# Patient Record
Sex: MALE | NOT HISPANIC OR LATINO | Employment: FULL TIME | URBAN - METROPOLITAN AREA
[De-identification: names, ages, dates, MRNs, and addresses within clinical notes are randomized per-mention and may not be internally consistent; named-entity substitution may affect disease eponyms.]

---

## 2020-08-17 ENCOUNTER — OFFICE VISIT (OUTPATIENT)
Dept: PRIMARY CARE CLINIC | Facility: CLINIC | Age: 65
End: 2020-08-17
Payer: COMMERCIAL

## 2020-08-17 VITALS
HEART RATE: 95 BPM | TEMPERATURE: 98 F | BODY MASS INDEX: 24.99 KG/M2 | HEIGHT: 65 IN | DIASTOLIC BLOOD PRESSURE: 76 MMHG | RESPIRATION RATE: 18 BRPM | OXYGEN SATURATION: 98 % | SYSTOLIC BLOOD PRESSURE: 128 MMHG | WEIGHT: 150 LBS

## 2020-08-17 DIAGNOSIS — Z00.01 ANNUAL VISIT FOR GENERAL ADULT MEDICAL EXAMINATION WITH ABNORMAL FINDINGS: Primary | ICD-10-CM

## 2020-08-17 DIAGNOSIS — L98.9 SKIN LESION: ICD-10-CM

## 2020-08-17 DIAGNOSIS — K40.90 HERNIA, INGUINAL, RIGHT: ICD-10-CM

## 2020-08-17 DIAGNOSIS — E03.9 HYPOTHYROIDISM, UNSPECIFIED TYPE: ICD-10-CM

## 2020-08-17 PROCEDURE — 99396 PREV VISIT EST AGE 40-64: CPT | Mod: S$GLB,,, | Performed by: FAMILY MEDICINE

## 2020-08-17 PROCEDURE — 99396 PR PREVENTIVE VISIT,EST,40-64: ICD-10-PCS | Mod: S$GLB,,, | Performed by: FAMILY MEDICINE

## 2020-08-17 RX ORDER — LEVOTHYROXINE SODIUM 100 UG/1
100 TABLET ORAL
COMMUNITY

## 2020-08-17 NOTE — PROGRESS NOTES
"Subjective:       Patient ID: Marlo Downey is a 64 y.o. male.    Chief Complaint: Annual Exam    Patient presents for his annual wellness visit.  It has been a few years since he has been in.  He states overall he is feeling well except for his right inguinal hernia which is slowly worsening.  He is ready to see someone for it.  Also with history of a skin lesion to his right ear.  It has been there for the last few years.  He will scratch it away and then it will return.  He has never seen a dermatology.  Also with history of hypothyroidism.  He currently takes levothyroxine 100 mcg daily.  He states he feels it is working well.  He denies any side effects.        Review of Systems   Constitutional: Negative for chills, fatigue, fever and unexpected weight change.   Eyes: Negative for visual disturbance.   Respiratory: Negative for shortness of breath.    Cardiovascular: Negative for chest pain, palpitations and leg swelling.   Gastrointestinal: Negative for abdominal pain, blood in stool, constipation, diarrhea, nausea and vomiting.   Genitourinary: Negative for dysuria and hematuria.   Musculoskeletal: Negative for arthralgias and back pain.   Skin: Negative for rash and wound.   Neurological: Negative for dizziness, tremors, syncope, weakness, light-headedness, numbness and headaches.   Hematological: Negative for adenopathy. Does not bruise/bleed easily.   Psychiatric/Behavioral: Negative for dysphoric mood. The patient is not nervous/anxious.      Medication List with Changes/Refills   Current Medications    LEVOTHYROXINE (SYNTHROID) 100 MCG TABLET    Take 100 mcg by mouth before breakfast.    MULTIVITAMIN WITH MINERALS TABLET    Take 1 tablet by mouth once daily.         Objective:      /76 (BP Location: Right arm, Patient Position: Sitting, BP Method: Medium (Manual))   Pulse 95   Temp 98.1 °F (36.7 °C)   Resp 18   Ht 5' 5" (1.651 m)   Wt 68 kg (150 lb)   SpO2 98%   BMI 24.96 kg/m² " "  Estimated body mass index is 24.96 kg/m² as calculated from the following:    Height as of this encounter: 5' 5" (1.651 m).    Weight as of this encounter: 68 kg (150 lb).  Physical Exam  Vitals signs reviewed.   Constitutional:       General: He is not in acute distress.     Appearance: Normal appearance. He is well-developed. He is obese. He is not ill-appearing.   HENT:      Head: Normocephalic and atraumatic.   Eyes:      Conjunctiva/sclera: Conjunctivae normal.   Neck:      Musculoskeletal: Neck supple.      Thyroid: No thyromegaly.   Cardiovascular:      Rate and Rhythm: Normal rate and regular rhythm.      Heart sounds: Normal heart sounds. No murmur.   Pulmonary:      Effort: Pulmonary effort is normal.      Breath sounds: Normal breath sounds.   Abdominal:      General: Bowel sounds are normal.      Palpations: Abdomen is soft. There is no mass.      Tenderness: There is no abdominal tenderness. There is no guarding or rebound.      Hernia: A hernia (right inguinal) is present.   Musculoskeletal: Normal range of motion.   Skin:     General: Skin is dry.      Comments: Right external ear with to dry and raised areas.  Also to his scalp there are a few scattered lesions which appear to be actinic keratoses.   Neurological:      Mental Status: He is alert and oriented to person, place, and time.   Psychiatric:         Mood and Affect: Mood normal.         Behavior: Behavior normal.         Thought Content: Thought content normal.         Judgment: Judgment normal.         Assessment:       Problem List Items Addressed This Visit     None      Visit Diagnoses     Annual visit for general adult medical examination with abnormal findings    -  Primary    Hernia, inguinal, right        Relevant Orders    Ambulatory referral/consult to General Surgery    Skin lesion        Relevant Orders    Ambulatory referral/consult to Dermatology    Hypothyroidism, unspecified type                Plan:       Annual visit for " general adult medical examination with abnormal findings    Hernia, inguinal, right  -     Ambulatory referral/consult to General Surgery; Future; Expected date: 08/24/2020    Skin lesion  -     Ambulatory referral/consult to Dermatology; Future; Expected date: 08/24/2020    Hypothyroidism, unspecified type              DISCUSSION:  Labs are good.  Cont meds.  Healthy diet and exercise.  Refer to diff specialists.

## 2020-08-25 ENCOUNTER — OFFICE VISIT (OUTPATIENT)
Dept: SURGERY | Facility: CLINIC | Age: 65
End: 2020-08-25
Payer: COMMERCIAL

## 2020-08-25 VITALS
HEART RATE: 80 BPM | OXYGEN SATURATION: 93 % | HEIGHT: 65 IN | WEIGHT: 147.81 LBS | SYSTOLIC BLOOD PRESSURE: 125 MMHG | BODY MASS INDEX: 24.63 KG/M2 | DIASTOLIC BLOOD PRESSURE: 83 MMHG

## 2020-08-25 DIAGNOSIS — K40.90 HERNIA, INGUINAL, RIGHT: ICD-10-CM

## 2020-08-25 DIAGNOSIS — D68.9 COAGULATION DEFECT: Primary | ICD-10-CM

## 2020-08-25 PROCEDURE — 99204 PR OFFICE/OUTPT VISIT, NEW, LEVL IV, 45-59 MIN: ICD-10-PCS | Mod: S$GLB,,, | Performed by: SURGERY

## 2020-08-25 PROCEDURE — 99204 OFFICE O/P NEW MOD 45 MIN: CPT | Mod: S$GLB,,, | Performed by: SURGERY

## 2020-08-25 NOTE — PROGRESS NOTES
Subjective:       Patient ID: Marlo Downey is a 64 y.o. male.    Chief Complaint: Inguinal Hernia    64-year-old male with complaints of a right inguinal hernia that he has had for at least 6 years, slowly getting in size and become more painful, he is sometimes able to reduce most of time is out.  Nothing is making this better is only progressively getting larger and more painful.    Review of Systems   Constitutional: Negative for chills, fatigue and fever.   HENT: Negative for nasal congestion and rhinorrhea.    Respiratory: Negative for shortness of breath and wheezing.    Cardiovascular: Negative for chest pain and palpitations.   Gastrointestinal: Negative for abdominal pain, blood in stool, diarrhea, nausea and vomiting.   Endocrine: Negative for cold intolerance and heat intolerance.   Genitourinary: Negative for difficulty urinating.   Musculoskeletal: Negative for joint swelling and myalgias.   Integumentary:  Negative for rash and wound.   Neurological: Negative for weakness, light-headedness and numbness.   Psychiatric/Behavioral: Negative for agitation and confusion.         Objective:      Physical Exam  Vitals signs reviewed.   Constitutional:       Appearance: He is well-developed.   HENT:      Head: Normocephalic and atraumatic.   Eyes:      Conjunctiva/sclera: Conjunctivae normal.   Neck:      Musculoskeletal: Normal range of motion.      Trachea: Trachea normal.   Cardiovascular:      Rate and Rhythm: Normal rate and regular rhythm.   Pulmonary:      Effort: Pulmonary effort is normal.      Breath sounds: Normal breath sounds.   Abdominal:      General: There is no distension.      Palpations: Abdomen is soft.      Tenderness: There is no abdominal tenderness. There is no guarding.      Hernia: A hernia (Large right inguinal hernia, not reducible at bedside) is present.       Musculoskeletal: Normal range of motion.   Skin:     General: Skin is warm and dry.   Neurological:      Mental Status:  He is alert and oriented to person, place, and time.   Psychiatric:         Speech: Speech normal.         Behavior: Behavior normal.         Assessment:       1. Hernia, inguinal, right        Plan:       Large incarcerated right inguinal hernia, risks benefits surgical intervention discussed patient in detail, schedule patient for robotic right inguinal hernia repair

## 2020-08-25 NOTE — LETTER
August 25, 2020      Roberto Osborn MD  [address] [facility] [address]           [facility] General Surgery  [address] [address]  Phone: [phone]  Fax: [phone]          Patient: Marlo Downey   MR Number: [id number]   YOB: 1955   Date of Visit: 8/25/2020       Dear Dr. Roberto Osborn:    Thank you for referring Marlo Downey to me for evaluation. Attached you will find relevant portions of my assessment and plan of care.    If you have questions, please do not hesitate to call me. I look forward to following Marlo Downey along with you.    Sincerely,    Brando Isaacs, DO    Enclosure  CC:  No Recipients    If you would like to receive this communication electronically, please contact [email or web address] or [phone] to request more information on [employer] access.    For providers and/or their staff who would like to refer a patient to [facility], please contact us through our one-stop-shop provider referral line, [facility], at [phone].    If you feel you have received this communication in error or would no longer like to receive these types of communications, please e-mail [email or web address]

## 2020-10-06 ENCOUNTER — TELEPHONE (OUTPATIENT)
Dept: PRIMARY CARE CLINIC | Facility: CLINIC | Age: 65
End: 2020-10-06

## 2020-10-06 DIAGNOSIS — L98.9 SKIN LESION: Primary | ICD-10-CM

## 2020-10-06 NOTE — TELEPHONE ENCOUNTER
----- Message from Joe Diaz sent at 10/6/2020  8:22 AM CDT -----  Contact: self  Requesting call back regarding pt states the skin doctor that Dr. Osborn referred him to has never called for an appt. Please call back at [phone].

## 2020-10-07 NOTE — TELEPHONE ENCOUNTER
I sent a referral to Dr. Bullock.  As far as whether not it is covered by his insurance we will have to wait and see what their office says after they run it through their computer.

## 2020-10-26 LAB
ABS NRBC COUNT: 0 THOU/UL (ref 0–0.01)
ANION GAP SERPL CALC-SCNC: 5 MMOL/L (ref 3–11)
APTT PPP: 27.9 SEC (ref 25.1–36.5)
BUN SERPL-MCNC: 11 MG/DL (ref 7–18)
CALCIUM BLD-MCNC: NEGATIVE MG/DL
CALCIUM SERPL-MCNC: 9.1 MG/DL (ref 8.5–10.1)
CHLORIDE SERPL-SCNC: 109 MMOL/L (ref 98–107)
CO2 SERPL-SCNC: 28 MMOL/L (ref 21–32)
COVID-19 AB, IGM: NEGATIVE
CREAT SERPL-MCNC: 0.99 MG/DL (ref 0.7–1.3)
ERYTHROCYTE [DISTWIDTH] IN BLOOD BY AUTOMATED COUNT: 11.9 % (ref 0–15.5)
GFR ESTIMATION: > 60
GLUCOSE SERPL-MCNC: 93 MG/DL (ref 74–106)
HCT VFR BLD AUTO: 50.1 % (ref 42–52)
HGB BLD-MCNC: 15.7 G/DL (ref 14–18)
INR PPP: 0.9 INR (ref 0.9–1.1)
MCH RBC QN AUTO: 30.7 PG (ref 27–32)
MCHC RBC AUTO-ENTMCNC: 31.3 % (ref 32–36)
MCV RBC AUTO: 97.9 FL (ref 80–97)
NUCLEATED RED BLOOD CELLS: 0 % (ref 0–0.2)
PATIENT EMPLOYED IN HEALTHCARE: NO
PATIENT HAS SYMPTOMS FOR CONDITION OF INTEREST: NO
PATIENT HOSPITALIZED BC COND: NO
PATIENT IN CONGREGATE CARE: NO
PLATELETS: 227 10*3/UL (ref 130–400)
PMV BLD AUTO: 10.4 FL (ref 9.2–12.2)
POTASSIUM SERPL-SCNC: 4.7 MMOL/L (ref 3.5–5.1)
PROTHROMBIN TIME: 10.6 SEC (ref 10.2–12.9)
RBC # BLD AUTO: 5.12 10*6/UL (ref 4.7–6.1)
SODIUM BLD-SCNC: 142 MMOL/L (ref 131–143)
WBC # BLD: 4.7 10*3/UL (ref 4.5–10)

## 2020-10-29 ENCOUNTER — OUTSIDE PLACE OF SERVICE (OUTPATIENT)
Dept: SURGERY | Facility: CLINIC | Age: 65
End: 2020-10-29

## 2020-10-29 ENCOUNTER — OUTSIDE PLACE OF SERVICE (OUTPATIENT)
Dept: SURGERY | Facility: CLINIC | Age: 65
End: 2020-10-29
Payer: COMMERCIAL

## 2020-10-29 PROCEDURE — 49659 UNLSTD LAPS PX HRNAP HRNRPHY: CPT | Mod: AS,,, | Performed by: REGISTERED NURSE

## 2020-10-29 PROCEDURE — 49659 PR LAPAROSCOPIC REPAIR INCAR INGUINAL HERNIA: ICD-10-PCS | Mod: ,,, | Performed by: SURGERY

## 2020-10-29 PROCEDURE — 49659 PR LAPAROSCOPIC REPAIR INCAR INGUINAL HERNIA: ICD-10-PCS | Mod: AS,,, | Performed by: REGISTERED NURSE

## 2020-10-29 PROCEDURE — 49659 UNLSTD LAPS PX HRNAP HRNRPHY: CPT | Mod: ,,, | Performed by: SURGERY

## 2020-11-16 ENCOUNTER — OFFICE VISIT (OUTPATIENT)
Dept: SURGERY | Facility: CLINIC | Age: 65
End: 2020-11-16
Payer: COMMERCIAL

## 2020-11-16 VITALS
HEART RATE: 79 BPM | WEIGHT: 147 LBS | SYSTOLIC BLOOD PRESSURE: 144 MMHG | DIASTOLIC BLOOD PRESSURE: 80 MMHG | OXYGEN SATURATION: 99 % | BODY MASS INDEX: 24.46 KG/M2 | RESPIRATION RATE: 16 BRPM

## 2020-11-16 DIAGNOSIS — Z98.890 STATUS POST SURGERY: Primary | ICD-10-CM

## 2020-11-16 PROCEDURE — 99024 POSTOP FOLLOW-UP VISIT: CPT | Mod: S$GLB,,, | Performed by: REGISTERED NURSE

## 2020-11-16 PROCEDURE — 99024 PR POST-OP FOLLOW-UP VISIT: ICD-10-PCS | Mod: S$GLB,,, | Performed by: REGISTERED NURSE

## 2020-11-16 NOTE — PATIENT INSTRUCTIONS
Continue to monitor swollen area and right inguinal area.  Discussed with patient that hematoma would reabsorbed over time, possibly a couple of months.  If patient develops any complications in the area to contact our office ASAP.  Patient stated understanding of everything discussed on today's visit.  He had no further questions and was satisfied was visit.

## 2020-11-16 NOTE — PROGRESS NOTES
Marlo Downey is a 65 y.o. male patient.   No diagnosis found.  Past Medical History:   Diagnosis Date    Thyroid disease      No past surgical history pertinent negatives on file.  Scheduled Meds:  Continuous Infusions:  PRN Meds:    Review of patient's allergies indicates:  No Known Allergies  There are no hospital problems to display for this patient.    Blood pressure (!) 144/80, pulse 79, resp. rate 16, weight 66.7 kg (147 lb), SpO2 99 %.    Subjective:   Diet: Adequate intake (Patient having normal bowel movements and passing gas without complications.).  Patient reports no nausea or vomiting.    Activity level: Normal.    Pain control: Well controlled.    Wound: Subjective wound complaints: Complaint of swelling in the right inguinal area.      Objective:  Vital signs (most recent): Blood pressure (!) 144/80, pulse 79, resp. rate 16, weight 66.7 kg (147 lb), SpO2 99 %.  General appearance: Comfortable, well-appearing, in no acute distress and not in pain.    Lungs:  Breath sounds normal.    Heart: Normal rate.    Abdomen: Abdomen is soft.  (Patient has a palpable mass in the right inguinal area.  Nontender to palpation and mobile.  No fluctuance noted.  Possible hematoma.  No redness, warmth, drainage or other signs of infection.).    Wound:  Clean.  There is no drainage.    Extremities: There is normal range of motion.    Neurological: The patient is alert and oriented to person, place and time.       Assessment:   Post-op: 18 days.      Plan:  Encourage ambulation (No lifting anything heavier than 30 pounds until December 10, 2020 where he is released from all restrictions.).  Wound care plan: Discussed with patient he hematoma would reabsorbed over time (possibly a couple of months).  Informed patient if he had any redness, warmth or pain in the area to contact our office ASAP.  Regular diet.  General Orders/Medications Plan: Follow up in my office on a prn basis.           Tariq Arellano,  NP  11/16/2020

## 2021-05-12 ENCOUNTER — PATIENT MESSAGE (OUTPATIENT)
Dept: RESEARCH | Facility: HOSPITAL | Age: 66
End: 2021-05-12

## 2022-10-14 ENCOUNTER — PATIENT MESSAGE (OUTPATIENT)
Dept: ADMINISTRATIVE | Facility: HOSPITAL | Age: 67
End: 2022-10-14
Payer: COMMERCIAL

## 2024-11-18 DIAGNOSIS — R41.3 MEMORY CHANGE: Primary | ICD-10-CM
